# Patient Record
Sex: MALE | Race: WHITE | NOT HISPANIC OR LATINO
[De-identification: names, ages, dates, MRNs, and addresses within clinical notes are randomized per-mention and may not be internally consistent; named-entity substitution may affect disease eponyms.]

---

## 2017-07-19 ENCOUNTER — APPOINTMENT (OUTPATIENT)
Dept: HEART AND VASCULAR | Facility: CLINIC | Age: 52
End: 2017-07-19

## 2017-07-19 ENCOUNTER — RX RENEWAL (OUTPATIENT)
Age: 52
End: 2017-07-19

## 2017-07-19 VITALS
HEART RATE: 70 BPM | RESPIRATION RATE: 12 BRPM | SYSTOLIC BLOOD PRESSURE: 110 MMHG | BODY MASS INDEX: 30.51 KG/M2 | DIASTOLIC BLOOD PRESSURE: 70 MMHG | WEIGHT: 206 LBS | HEIGHT: 69 IN

## 2017-07-19 DIAGNOSIS — R06.09 OTHER FORMS OF DYSPNEA: ICD-10-CM

## 2017-07-19 DIAGNOSIS — Z82.49 FAMILY HISTORY OF ISCHEMIC HEART DISEASE AND OTHER DISEASES OF THE CIRCULATORY SYSTEM: ICD-10-CM

## 2017-07-19 DIAGNOSIS — M51.9 UNSPECIFIED THORACIC, THORACOLUMBAR AND LUMBOSACRAL INTERVERTEBRAL DISC DISORDER: ICD-10-CM

## 2017-07-19 DIAGNOSIS — Z87.898 PERSONAL HISTORY OF OTHER SPECIFIED CONDITIONS: ICD-10-CM

## 2017-07-19 DIAGNOSIS — R07.9 CHEST PAIN, UNSPECIFIED: ICD-10-CM

## 2017-07-19 DIAGNOSIS — G89.29 OTHER CHRONIC PAIN: ICD-10-CM

## 2017-07-19 DIAGNOSIS — Z80.42 FAMILY HISTORY OF MALIGNANT NEOPLASM OF PROSTATE: ICD-10-CM

## 2017-07-19 DIAGNOSIS — E74.04: ICD-10-CM

## 2017-07-19 DIAGNOSIS — Z82.69 FAMILY HISTORY OF OTHER DISEASES OF THE MUSCULOSKELETAL SYSTEM AND CONNECTIVE TISSUE: ICD-10-CM

## 2017-07-19 DIAGNOSIS — R94.31 ABNORMAL ELECTROCARDIOGRAM [ECG] [EKG]: ICD-10-CM

## 2017-07-19 DIAGNOSIS — M51.36 OTHER INTERVERTEBRAL DISC DEGENERATION, LUMBAR REGION: ICD-10-CM

## 2017-07-19 PROBLEM — Z00.00 ENCOUNTER FOR PREVENTIVE HEALTH EXAMINATION: Status: ACTIVE | Noted: 2017-07-19

## 2017-07-19 RX ORDER — OMEPRAZOLE 20 MG/1
20 CAPSULE, DELAYED RELEASE ORAL
Refills: 0 | Status: ACTIVE | COMMUNITY

## 2017-08-11 ENCOUNTER — APPOINTMENT (OUTPATIENT)
Dept: HEART AND VASCULAR | Facility: CLINIC | Age: 52
End: 2017-08-11
Payer: COMMERCIAL

## 2017-08-11 PROCEDURE — 93306 TTE W/DOPPLER COMPLETE: CPT

## 2017-08-11 PROCEDURE — 93880 EXTRACRANIAL BILAT STUDY: CPT

## 2017-08-30 ENCOUNTER — RESULT REVIEW (OUTPATIENT)
Age: 52
End: 2017-08-30

## 2017-09-28 ENCOUNTER — APPOINTMENT (OUTPATIENT)
Dept: HEART AND VASCULAR | Facility: CLINIC | Age: 52
End: 2017-09-28
Payer: COMMERCIAL

## 2017-09-28 VITALS
HEIGHT: 69 IN | RESPIRATION RATE: 16 BRPM | WEIGHT: 206 LBS | HEART RATE: 70 BPM | SYSTOLIC BLOOD PRESSURE: 124 MMHG | DIASTOLIC BLOOD PRESSURE: 80 MMHG | BODY MASS INDEX: 30.51 KG/M2

## 2017-09-28 PROCEDURE — 36415 COLL VENOUS BLD VENIPUNCTURE: CPT

## 2017-09-28 PROCEDURE — 99214 OFFICE O/P EST MOD 30 MIN: CPT | Mod: 25

## 2017-09-28 RX ORDER — METOPROLOL SUCCINATE 100 MG/1
100 TABLET, EXTENDED RELEASE ORAL
Qty: 2 | Refills: 0 | Status: DISCONTINUED | COMMUNITY
Start: 2017-07-19 | End: 2017-09-28

## 2017-09-28 RX ORDER — OXYCODONE HYDROCHLORIDE AND ACETAMINOPHEN 5; 325 MG/1; MG/1
5-325 TABLET ORAL
Refills: 0 | Status: DISCONTINUED | COMMUNITY
End: 2017-09-28

## 2017-09-29 ENCOUNTER — RESULT REVIEW (OUTPATIENT)
Age: 52
End: 2017-09-29

## 2017-09-29 LAB
ALBUMIN SERPL ELPH-MCNC: 4.1 G/DL
ALP BLD-CCNC: 60 U/L
ALT SERPL-CCNC: 24 U/L
ANION GAP SERPL CALC-SCNC: 15 MMOL/L
AST SERPL-CCNC: 19 U/L
BASOPHILS # BLD AUTO: 0.02 K/UL
BASOPHILS NFR BLD AUTO: 0.4 %
BILIRUB SERPL-MCNC: 0.5 MG/DL
BUN SERPL-MCNC: 15 MG/DL
CALCIUM SERPL-MCNC: 8.5 MG/DL
CHLORIDE SERPL-SCNC: 103 MMOL/L
CHOLEST SERPL-MCNC: 197 MG/DL
CHOLEST/HDLC SERPL: 4.2 RATIO
CO2 SERPL-SCNC: 24 MMOL/L
CREAT SERPL-MCNC: 0.82 MG/DL
EOSINOPHIL # BLD AUTO: 0.13 K/UL
EOSINOPHIL NFR BLD AUTO: 2.4 %
GLUCOSE SERPL-MCNC: 156 MG/DL
HCT VFR BLD CALC: 42.5 %
HDLC SERPL-MCNC: 47 MG/DL
HGB BLD-MCNC: 14.2 G/DL
IMM GRANULOCYTES NFR BLD AUTO: 0.2 %
LDLC SERPL CALC-MCNC: 111 MG/DL
LYMPHOCYTES # BLD AUTO: 2.1 K/UL
LYMPHOCYTES NFR BLD AUTO: 38.4 %
MAN DIFF?: NORMAL
MCHC RBC-ENTMCNC: 30.5 PG
MCHC RBC-ENTMCNC: 33.4 GM/DL
MCV RBC AUTO: 91.4 FL
MONOCYTES # BLD AUTO: 0.58 K/UL
MONOCYTES NFR BLD AUTO: 10.6 %
NEUTROPHILS # BLD AUTO: 2.63 K/UL
NEUTROPHILS NFR BLD AUTO: 48 %
PLATELET # BLD AUTO: 182 K/UL
POTASSIUM SERPL-SCNC: 3.9 MMOL/L
PROT SERPL-MCNC: 6.9 G/DL
RBC # BLD: 4.65 M/UL
RBC # FLD: 12.5 %
SODIUM SERPL-SCNC: 142 MMOL/L
TRIGL SERPL-MCNC: 194 MG/DL
WBC # FLD AUTO: 5.47 K/UL

## 2021-04-28 ENCOUNTER — APPOINTMENT (OUTPATIENT)
Dept: RHEUMATOLOGY | Facility: CLINIC | Age: 56
End: 2021-04-28

## 2022-01-26 ENCOUNTER — APPOINTMENT (OUTPATIENT)
Dept: HEART AND VASCULAR | Facility: CLINIC | Age: 57
End: 2022-01-26
Payer: COMMERCIAL

## 2022-01-26 VITALS
SYSTOLIC BLOOD PRESSURE: 140 MMHG | WEIGHT: 225 LBS | BODY MASS INDEX: 32.21 KG/M2 | HEIGHT: 70 IN | RESPIRATION RATE: 12 BRPM | OXYGEN SATURATION: 96 % | HEART RATE: 69 BPM | DIASTOLIC BLOOD PRESSURE: 80 MMHG | TEMPERATURE: 98 F

## 2022-01-26 DIAGNOSIS — Z87.19 PERSONAL HISTORY OF OTHER DISEASES OF THE DIGESTIVE SYSTEM: ICD-10-CM

## 2022-01-26 PROCEDURE — 99215 OFFICE O/P EST HI 40 MIN: CPT

## 2022-01-26 RX ORDER — ASPIRIN 81 MG
81 TABLET, DELAYED RELEASE (ENTERIC COATED) ORAL
Refills: 0 | Status: ACTIVE | COMMUNITY

## 2022-01-26 RX ORDER — METFORMIN HYDROCHLORIDE 500 MG/1
500 TABLET, COATED ORAL
Refills: 0 | Status: ACTIVE | COMMUNITY

## 2022-01-26 RX ORDER — ROSUVASTATIN CALCIUM 10 MG/1
10 TABLET, FILM COATED ORAL
Refills: 0 | Status: ACTIVE | COMMUNITY

## 2022-01-26 NOTE — PHYSICAL EXAM
[General Appearance - Well Developed] : well developed [Normal Appearance] : normal appearance [Well Groomed] : well groomed [General Appearance - Well Nourished] : well nourished [No Deformities] : no deformities [General Appearance - In No Acute Distress] : no acute distress [Normal Oral Mucosa] : normal oral mucosa [No Oral Pallor] : no oral pallor [No Oral Cyanosis] : no oral cyanosis [Normal Jugular Venous A Waves Present] : normal jugular venous A waves present [Normal Jugular Venous V Waves Present] : normal jugular venous V waves present [No Jugular Venous Mckeon A Waves] : no jugular venous mckeon A waves [Respiration, Rhythm And Depth] : normal respiratory rhythm and effort [Exaggerated Use Of Accessory Muscles For Inspiration] : no accessory muscle use [Auscultation Breath Sounds / Voice Sounds] : lungs were clear to auscultation bilaterally [Heart Rate And Rhythm] : heart rate and rhythm were normal [Heart Sounds] : normal S1 and S2 [Murmurs] : no murmurs present [Abdomen Soft] : soft [Abdomen Tenderness] : non-tender [Abdomen Mass (___ Cm)] : no abdominal mass palpated [Abnormal Walk] : normal gait [Gait - Sufficient For Exercise Testing] : the gait was sufficient for exercise testing [Nail Clubbing] : no clubbing of the fingernails [Cyanosis, Localized] : no localized cyanosis [Petechial Hemorrhages (___cm)] : no petechial hemorrhages [Skin Color & Pigmentation] : normal skin color and pigmentation [] : no rash [No Venous Stasis] : no venous stasis [Skin Lesions] : no skin lesions [No Skin Ulcers] : no skin ulcer [No Xanthoma] : no  xanthoma was observed

## 2022-01-26 NOTE — HISTORY OF PRESENT ILLNESS
[FreeTextEntry1] : Deric Xie returns for follow up.  He was last seen in September 2017.  \par \par He has L upper chest discomfort that is typically non exertional.  He denies sob, pnd, orthopnea, edema, palp, or loc.\par \par He is active.  He is not taking meds regularly. \par \par CTA 8/2017:  LAD/CX/RCA disease - mild; aortic root 4.2 cm\par Carotid Duplex 8/2017: no sig disease\par TTE 8/2017: nl lv sys fxn with RWMA; jasso dys\par \par Clinical hx reviewed in detail.\par \par Recommendations:\par 1. continue CV meds - encourage med compliance\par 2. exercise\par 3. EXSE\par 4. CPET\par 5. carotid duplex\par 6. collect records from Rheumatology\par 7. awaiting CPAP

## 2022-01-26 NOTE — REASON FOR VISIT
[Hypertension] : hypertension [Carotid, Aortic and Peripheral Vascular Disease] : carotid, aortic and peripheral vascular disease [FreeTextEntry3] : Pradip Soliz [Follow-Up - Clinic] : a clinic follow-up of [Cardiomyopathy] : cardiomyopathy [Coronary Artery Disease] : coronary artery disease [Hyperlipidemia] : hyperlipidemia [Peripheral Vascular Disease] : peripheral vascular disease

## 2022-01-26 NOTE — DISCUSSION/SUMMARY
[Cardiomyopathy] : cardiomyopathy [Sodium Restriction] : sodium restriction [Coronary Artery Disease] : coronary artery disease [Possible Cardiac Ischemia (Intermd Prob)] : possible cardiac ischemia (intermediate probability) [Non-Cardiac] : non-cardiac symptoms [Deteriorating] : deteriorating [Dietary Modification] : dietary modification [Exercise Regimen] : an exercise regimen [Weight Reduction] : weight reduction [Hyperlipidemia] : hyperlipidemia [Diet Modification] : diet modification [Exercise] : exercise [Weight Loss] : weight loss [Stable] : stable [None] : There are no changes in medication management [Exercise Rehab] : exercise rehabilitation [Patient] : the patient [de-identified] : dilated aortic root 4.2 cm; carotid artery plaque [FreeTextEntry1] : GURMEET - untreated - awaiting cpap\par \par pre diabetes

## 2022-02-08 ENCOUNTER — APPOINTMENT (OUTPATIENT)
Dept: HEART AND VASCULAR | Facility: CLINIC | Age: 57
End: 2022-02-08
Payer: COMMERCIAL

## 2022-02-08 ENCOUNTER — NON-APPOINTMENT (OUTPATIENT)
Age: 57
End: 2022-02-08

## 2022-02-08 VITALS
SYSTOLIC BLOOD PRESSURE: 112 MMHG | OXYGEN SATURATION: 98 % | DIASTOLIC BLOOD PRESSURE: 90 MMHG | BODY MASS INDEX: 32.88 KG/M2 | HEIGHT: 69 IN | TEMPERATURE: 97.9 F | WEIGHT: 222 LBS

## 2022-02-08 PROCEDURE — 94621 CARDIOPULM EXERCISE TESTING: CPT

## 2022-02-08 PROCEDURE — 94010 BREATHING CAPACITY TEST: CPT | Mod: 59

## 2022-02-09 ENCOUNTER — APPOINTMENT (OUTPATIENT)
Dept: HEART AND VASCULAR | Facility: CLINIC | Age: 57
End: 2022-02-09
Payer: COMMERCIAL

## 2022-02-09 VITALS
DIASTOLIC BLOOD PRESSURE: 72 MMHG | BODY MASS INDEX: 31.78 KG/M2 | HEIGHT: 70 IN | WEIGHT: 222 LBS | OXYGEN SATURATION: 96 % | SYSTOLIC BLOOD PRESSURE: 136 MMHG | HEART RATE: 72 BPM

## 2022-02-09 PROCEDURE — 93880 EXTRACRANIAL BILAT STUDY: CPT

## 2022-02-09 PROCEDURE — 93351 STRESS TTE COMPLETE: CPT

## 2022-02-09 PROCEDURE — 93320 DOPPLER ECHO COMPLETE: CPT

## 2022-02-09 PROCEDURE — 93325 DOPPLER ECHO COLOR FLOW MAPG: CPT

## 2022-02-16 ENCOUNTER — NON-APPOINTMENT (OUTPATIENT)
Age: 57
End: 2022-02-16

## 2022-03-23 ENCOUNTER — APPOINTMENT (OUTPATIENT)
Dept: HEART AND VASCULAR | Facility: CLINIC | Age: 57
End: 2022-03-23
Payer: COMMERCIAL

## 2022-03-23 DIAGNOSIS — I25.5 ISCHEMIC CARDIOMYOPATHY: ICD-10-CM

## 2022-03-23 PROCEDURE — 99443: CPT

## 2022-03-23 NOTE — REVIEW OF SYSTEMS
[Joint Pain] : joint pain [Joint Stiffness] : joint stiffness [Myalgia] : myalgia [Negative] : Heme/Lymph [Joint Swelling] : no joint swelling [Muscle Cramps] : no muscle cramps

## 2022-03-23 NOTE — DISCUSSION/SUMMARY
[Cardiomyopathy] : cardiomyopathy [Sodium Restriction] : sodium restriction [Coronary Artery Disease] : coronary artery disease [Possible Cardiac Ischemia (Intermd Prob)] : possible cardiac ischemia (intermediate probability) [Non-Cardiac] : non-cardiac symptoms [Deteriorating] : deteriorating [Dietary Modification] : dietary modification [Exercise Regimen] : an exercise regimen [Weight Reduction] : weight reduction [Hyperlipidemia] : hyperlipidemia [Diet Modification] : diet modification [Exercise] : exercise [Weight Loss] : weight loss [Stable] : stable [None] : There are no changes in medication management [Exercise Rehab] : exercise rehabilitation [Patient] : the patient [de-identified] : LVH; indeterminant jasso fxn [de-identified] : dilated aortic root 4.2 cm; carotid artery plaque [FreeTextEntry1] : GURMEET - untreated - awaiting cpap\par \par pre diabetes\par \par AI/TR - mild

## 2022-03-23 NOTE — HISTORY OF PRESENT ILLNESS
[FreeTextEntry1] : Deric Xie requests telephone follow up.  Consent obtained recorded.  He is at work and doctor is in [place], [place].    \par \par He has L upper chest discomfort that is typically non exertional.  He denies sob, pnd, orthopnea, edema, palp, or loc.\par \par He is active.  He is not taking meds regularly. \par \par CPET 1/2016: ischemic myocardial dysfxn; 112% predicted peak VO2\par CTA 8/2017:  LAD/CX/RCA disease - mild; aortic root 4.2 cm\par Carotid Duplex 8/2017: no sig disease\par TTE 8/2017: nl lv sys fxn with RWMA; jasso dys\par \par CPET 2/2022: ischemic myocardial dysfxn; 80% predicted peak VO2\par EXSE 2/2022: nl lv sys fxn; indeterminant jasso fxn; LVH; mild AI/TR; asc aorta 4.2 cm; 10:40 min Memo; no ischemia by WM\par Carotid Duplex 2/2022: min plaque b/l\par \par Clinical hx and results reviewed in detail.\par \par PE from January 2022 eval.\par \par Recommendations:\par 1. continue CV meds - encourage med compliance\par 2. exercise\par 3. get CTA\par 4. collect records from Rheumatology\par 5. awaiting CPAP

## 2022-03-23 NOTE — REASON FOR VISIT
[Structural Heart and Valve Disease] : structural heart and valve disease [Hyperlipidemia] : hyperlipidemia [Hypertension] : hypertension [Coronary Artery Disease] : coronary artery disease [Carotid, Aortic and Peripheral Vascular Disease] : carotid, aortic and peripheral vascular disease [FreeTextEntry3] : Pradip Soliz

## 2022-06-21 ENCOUNTER — RESULT REVIEW (OUTPATIENT)
Age: 57
End: 2022-06-21

## 2022-06-27 ENCOUNTER — NON-APPOINTMENT (OUTPATIENT)
Age: 57
End: 2022-06-27

## 2022-07-14 ENCOUNTER — RESULT REVIEW (OUTPATIENT)
Age: 57
End: 2022-07-14

## 2022-07-18 ENCOUNTER — NON-APPOINTMENT (OUTPATIENT)
Age: 57
End: 2022-07-18

## 2022-07-20 ENCOUNTER — NON-APPOINTMENT (OUTPATIENT)
Age: 57
End: 2022-07-20

## 2022-08-03 ENCOUNTER — NON-APPOINTMENT (OUTPATIENT)
Age: 57
End: 2022-08-03

## 2022-08-22 ENCOUNTER — NON-APPOINTMENT (OUTPATIENT)
Age: 57
End: 2022-08-22

## 2022-08-24 ENCOUNTER — NON-APPOINTMENT (OUTPATIENT)
Age: 57
End: 2022-08-24

## 2022-08-24 ENCOUNTER — APPOINTMENT (OUTPATIENT)
Dept: CARDIOTHORACIC SURGERY | Facility: CLINIC | Age: 57
End: 2022-08-24

## 2022-09-06 ENCOUNTER — NON-APPOINTMENT (OUTPATIENT)
Age: 57
End: 2022-09-06

## 2022-09-07 ENCOUNTER — APPOINTMENT (OUTPATIENT)
Dept: CARDIOTHORACIC SURGERY | Facility: CLINIC | Age: 57
End: 2022-09-07

## 2022-09-19 ENCOUNTER — NON-APPOINTMENT (OUTPATIENT)
Age: 57
End: 2022-09-19

## 2022-09-21 ENCOUNTER — APPOINTMENT (OUTPATIENT)
Dept: CARDIOTHORACIC SURGERY | Facility: CLINIC | Age: 57
End: 2022-09-21

## 2022-10-19 ENCOUNTER — APPOINTMENT (OUTPATIENT)
Dept: CARDIOTHORACIC SURGERY | Facility: CLINIC | Age: 57
End: 2022-10-19

## 2022-11-01 ENCOUNTER — NON-APPOINTMENT (OUTPATIENT)
Age: 57
End: 2022-11-01

## 2022-11-02 ENCOUNTER — APPOINTMENT (OUTPATIENT)
Dept: CARDIOTHORACIC SURGERY | Facility: CLINIC | Age: 57
End: 2022-11-02

## 2023-06-23 ENCOUNTER — TRANSCRIPTION ENCOUNTER (OUTPATIENT)
Age: 58
End: 2023-06-23

## 2023-07-12 ENCOUNTER — APPOINTMENT (OUTPATIENT)
Dept: HEART AND VASCULAR | Facility: CLINIC | Age: 58
End: 2023-07-12
Payer: COMMERCIAL

## 2023-07-12 VITALS
BODY MASS INDEX: 29.92 KG/M2 | DIASTOLIC BLOOD PRESSURE: 80 MMHG | HEART RATE: 64 BPM | RESPIRATION RATE: 12 BRPM | SYSTOLIC BLOOD PRESSURE: 132 MMHG | HEIGHT: 70 IN | WEIGHT: 209 LBS | TEMPERATURE: 98 F | OXYGEN SATURATION: 98 %

## 2023-07-12 DIAGNOSIS — R73.03 PREDIABETES.: ICD-10-CM

## 2023-07-12 DIAGNOSIS — I65.29 OCCLUSION AND STENOSIS OF UNSPECIFIED CAROTID ARTERY: ICD-10-CM

## 2023-07-12 DIAGNOSIS — G47.33 OBSTRUCTIVE SLEEP APNEA (ADULT) (PEDIATRIC): ICD-10-CM

## 2023-07-12 DIAGNOSIS — H53.9 UNSPECIFIED VISUAL DISTURBANCE: ICD-10-CM

## 2023-07-12 DIAGNOSIS — I25.10 ATHEROSCLEROTIC HEART DISEASE OF NATIVE CORONARY ARTERY W/OUT ANGINA PECTORIS: ICD-10-CM

## 2023-07-12 DIAGNOSIS — I51.7 CARDIOMEGALY: ICD-10-CM

## 2023-07-12 DIAGNOSIS — I35.1 NONRHEUMATIC AORTIC (VALVE) INSUFFICIENCY: ICD-10-CM

## 2023-07-12 DIAGNOSIS — E78.00 PURE HYPERCHOLESTEROLEMIA, UNSPECIFIED: ICD-10-CM

## 2023-07-12 DIAGNOSIS — I51.89 OTHER ILL-DEFINED HEART DISEASES: ICD-10-CM

## 2023-07-12 DIAGNOSIS — I07.1 RHEUMATIC TRICUSPID INSUFFICIENCY: ICD-10-CM

## 2023-07-12 PROCEDURE — 99215 OFFICE O/P EST HI 40 MIN: CPT

## 2023-07-12 RX ORDER — METOPROLOL SUCCINATE 100 MG/1
100 TABLET, EXTENDED RELEASE ORAL
Qty: 2 | Refills: 0 | Status: DISCONTINUED | COMMUNITY
Start: 2022-03-28 | End: 2023-07-12

## 2023-07-12 RX ORDER — ALBUTEROL SULFATE 5 MG/ML
(5 MG/ML) SOLUTION, NON-ORAL INHALATION
Refills: 0 | Status: DISCONTINUED | COMMUNITY
End: 2023-07-12

## 2023-07-12 NOTE — HISTORY OF PRESENT ILLNESS
[FreeTextEntry1] : Deric Xie returns for follow up.  \par \par He was last seen by me in March 2022.  \par \par He was not able to follow up with Dr Boudreaux re aorta management in late 2022 due family issues.\par \par He developed visual changes that led to eval at [facility].  CT, CTA, MRI of head and neck were unremarkable for CVA.  Ophtho eval is ongoing to address visual field issues (get records).  \par \par He snores and has daytime fatigue with known GURMEET diagnosis.  \par \par He denies cp, sob, pnd, orthopnea, edema, palp, or loc.\par \par He is active.  He is now compliant with meds.  He is not using CPAP.\par \par CPET 1/2016: ischemic myocardial dysfxn; 112% predicted peak VO2\par CTA 8/2017:  LAD/CX/RCA disease - mild; aortic root 4.2 cm\par Carotid Duplex 8/2017: no sig disease\par TTE 8/2017: nl lv sys fxn with RWMA; jasso dys\par \par CPET 2/2022: ischemic myocardial dysfxn; 80% predicted peak VO2\par EXSE 2/2022: nl lv sys fxn; indeterminant jasso fxn; LVH; mild AI/TR; asc aorta 4.2 cm; 10:40 min Memo; no ischemia by WM\par Carotid Duplex 2/2022: min plaque b/l\par CTA 6/2022: mild LAD (0.09->0.78 CT FFR), mild CX (0.94 CT FFR), mild RCA (0.99 CT FFR); aorta 4.9 cm\par \par Clinical hx  reviewed in detail.\par \par Recommendations:\par 1. continue CV meds - encourage med and CPAP compliance\par 2. exercise\par 3. telemetry\par 4. collect records from Rheumatology, Ophtho\par 5. reconnect with Dr Boudreaux

## 2023-07-12 NOTE — PHYSICAL EXAM
[General Appearance - Well Developed] : well developed [Normal Appearance] : normal appearance [Well Groomed] : well groomed [General Appearance - Well Nourished] : well nourished [No Deformities] : no deformities [General Appearance - In No Acute Distress] : no acute distress [Normal Oral Mucosa] : normal oral mucosa [No Oral Pallor] : no oral pallor [No Oral Cyanosis] : no oral cyanosis [Normal Jugular Venous A Waves Present] : normal jugular venous A waves present [Normal Jugular Venous V Waves Present] : normal jugular venous V waves present [No Jugular Venous Mckeon A Waves] : no jugular venous mckeon A waves [Respiration, Rhythm And Depth] : normal respiratory rhythm and effort [Exaggerated Use Of Accessory Muscles For Inspiration] : no accessory muscle use [Auscultation Breath Sounds / Voice Sounds] : lungs were clear to auscultation bilaterally [Heart Rate And Rhythm] : heart rate and rhythm were normal [Heart Sounds] : normal S1 and S2 [FreeTextEntry1] : sys and jasso murmur [Abdomen Soft] : soft [Abdomen Tenderness] : non-tender [Abdomen Mass (___ Cm)] : no abdominal mass palpated [Abnormal Walk] : normal gait [Gait - Sufficient For Exercise Testing] : the gait was sufficient for exercise testing [Nail Clubbing] : no clubbing of the fingernails [Cyanosis, Localized] : no localized cyanosis [Petechial Hemorrhages (___cm)] : no petechial hemorrhages [Skin Color & Pigmentation] : normal skin color and pigmentation [] : no rash [No Venous Stasis] : no venous stasis [Skin Lesions] : no skin lesions [No Skin Ulcers] : no skin ulcer [No Xanthoma] : no  xanthoma was observed

## 2023-07-12 NOTE — REVIEW OF SYSTEMS
[Fever] : no fever [Headache] : no headache [Chills] : no chills [Feeling Fatigued] : feeling fatigued [Blurry Vision] : no blurred vision [Seeing Double (Diplopia)] : no diplopia [Eye Pain] : no eye pain [Visual Disturbances] : visual disturbances [Joint Pain] : joint pain [Joint Swelling] : no joint swelling [Joint Stiffness] : joint stiffness [Muscle Cramps] : no muscle cramps [Myalgia] : myalgia [Negative] : Heme/Lymph

## 2023-07-12 NOTE — DISCUSSION/SUMMARY
[Cardiomyopathy] : cardiomyopathy [Sodium Restriction] : sodium restriction [Coronary Artery Disease] : coronary artery disease [Possible Cardiac Ischemia (Intermd Prob)] : possible cardiac ischemia (intermediate probability) [Non-Cardiac] : non-cardiac symptoms [Dietary Modification] : dietary modification [Exercise Regimen] : an exercise regimen [Weight Reduction] : weight reduction [Hyperlipidemia] : hyperlipidemia [Diet Modification] : diet modification [Exercise] : exercise [Weight Loss] : weight loss [Stable] : stable [None] : There are no changes in medication management [Exercise Rehab] : exercise rehabilitation [Patient] : the patient [de-identified] : mild LAD (0.90 -> 0.78 CT FFR), mild CX (0.94 CT FFR), mild RCA (0.99 CYT FFR) 6/2022 CTA [de-identified] : dilated aortic root and asc aorta 4.4 and 4.2 cm by TTE 6/2023; dilated aorta 4.9 cm y CTA 6/2022; dilated aortic root 4.2 cm; carotid artery plaque [FreeTextEntry1] : GURMEET - untreated - not using CPAP\par \par pre diabetes\par \par AI/TR - mild\par \par visual change

## 2023-07-13 ENCOUNTER — NON-APPOINTMENT (OUTPATIENT)
Age: 58
End: 2023-07-13

## 2023-07-14 ENCOUNTER — NON-APPOINTMENT (OUTPATIENT)
Age: 58
End: 2023-07-14

## 2023-07-19 ENCOUNTER — NON-APPOINTMENT (OUTPATIENT)
Age: 58
End: 2023-07-19

## 2023-08-09 ENCOUNTER — NON-APPOINTMENT (OUTPATIENT)
Age: 58
End: 2023-08-09

## 2023-08-23 ENCOUNTER — RESULT REVIEW (OUTPATIENT)
Age: 58
End: 2023-08-23

## 2023-08-28 NOTE — ASSESSMENT
[FreeTextEntry1] : 58 year old male with a past medical history of hyperlipidemia, obstructive sleep apnea (not on BIBAP) hypertension, pre-diabetes, coronary artery disease, peripheral vascular disease, carotid disease who presets for evaluation and management of aortic root aneurysm. Pt is referred by Dr. William Almonte.

## 2023-08-28 NOTE — PROCEDURE
[FreeTextEntry1] : Dr. Boudreaux reviewed the indications for surgery, and used our webpage [email or web address] <[email or web address]> to illustrate the aorta and anatomy of the heart. Those indications are the following: size greater than 5.0 cm, symptomatic aneurysms, family history of aortic dissection or aneurysm death with a size greater than 4.5 cm, other necessary cardiac procedures such as coronary artery bypass grafting or valvular disorders with an aneurysm greater than 4.5 cm, or connective tissue disorders with an aneurysm size greater than 4.5 cm. The patient does not meet size criteria for surgical intervention at the time. Dr. Boudreaux discussed activity restrictions with the patient, and would advise exercise at a moderate amount with no heavy lifting over one third of body weight, and avoiding heart rates that exceed 140 beats per minute. In addition, every patient should abstain from tobacco abuse and to avoid all illicit drug use, especially stimulants such as cocaine or methamphetamine. Dr. Boudreaux also counseled regarding maintaining a healthy heart diet, and losing any excessive weight as this also put undue stress on both the aorta and entire cardiovascular system. First degree family members should be screened for bicuspid valve disease, and ascending aortic aneurysms.   Patient was advised to view the educational video prior to this visit regarding aortic pathology, risk factors, surgical procedures, and lifestyle modifications. Video can be retrieved at <[email or web address]>.

## 2023-08-28 NOTE — DATA REVIEWED
[FreeTextEntry1] : CT Cardiac 6/21/2022  1.  The calcium score is moderate at 358 Agatston units, which is at the 90 percentile, adjusted for age, gender and race.  2.  Less than 25% stenosis of the Left Main.  3.  Mild stenoses of the proximal LAD, D1, proximal LCX, proximal and mid RCA.  4.  Minimum stenosis of the mid LAD.  5.  Remaining segments are normal. 6. No substantial change in aneurysmal aortic root and ascending aorta since 2017. Aortic root again measures up to 4.9 cm.  Stress echo 2/9/22:  Exercise Capacity is 12 METS,normal hemodynamic response, Normal EKG response, Normal augmentation in left ventricular systolic function, appropriate HR response, Appropriate BP response, Normal stress echo with no evidence of inducible ischemia.trileaflet AV. Mild AI. Aortic root 3.9cm. ascending aorta 4.2cm.     CT FFR DAta Prep and analysis 7/16/2022 1.CT-FFR is negative:The flow is > 0.80  2.CT-FFR is positive. However, overall does not suggest a discrete physiologically significant lesion: The flow is < or = to 0.80 in one or more segments of one or more coronary arteries (perhaps because it is a distal segment (vessel size is less than 2 mm)  3.CT-FFR is positive and clinically significant:The flow is < or = to 0.80, and the finding is clinically significant.  Carotid Duplex 2/9/22:  <50% stenosis of the internal carotid arteries bilaterally  Bilateral antegrade vertebral blood flow

## 2023-08-28 NOTE — HISTORY OF PRESENT ILLNESS
[FreeTextEntry1] : 58 year old male with a past medical history of hyperlipidemia, obstructive sleep apnea (not on BIBAP) hypertension, pre-diabetes, coronary artery disease, peripheral vascular disease, carotid disease who presets for evaluation and management of aortic root aneurysm. Pt is referred by Dr. William Almonte.   TTE 6/23/23 normal EF. ascending aorta 4.4cm. aortic arch 4.2cm.

## 2023-08-31 ENCOUNTER — NON-APPOINTMENT (OUTPATIENT)
Age: 58
End: 2023-08-31

## 2023-09-20 ENCOUNTER — APPOINTMENT (OUTPATIENT)
Dept: CARDIOTHORACIC SURGERY | Facility: CLINIC | Age: 58
End: 2023-09-20
Payer: COMMERCIAL

## 2023-09-20 ENCOUNTER — NON-APPOINTMENT (OUTPATIENT)
Age: 58
End: 2023-09-20

## 2023-09-20 VITALS
WEIGHT: 209 LBS | HEART RATE: 90 BPM | OXYGEN SATURATION: 94 % | HEIGHT: 70 IN | DIASTOLIC BLOOD PRESSURE: 80 MMHG | RESPIRATION RATE: 13 BRPM | SYSTOLIC BLOOD PRESSURE: 141 MMHG | TEMPERATURE: 98.1 F | BODY MASS INDEX: 29.92 KG/M2

## 2023-09-20 DIAGNOSIS — I77.810 THORACIC AORTIC ECTASIA: ICD-10-CM

## 2023-09-20 DIAGNOSIS — I71.20 THORACIC AORTIC ANEURYSM, WITHOUT RUPTURE, UNSPECIFIED: ICD-10-CM

## 2023-09-20 PROCEDURE — 99204 OFFICE O/P NEW MOD 45 MIN: CPT

## 2023-09-25 PROBLEM — I77.810 DILATED AORTIC ROOT: Status: ACTIVE | Noted: 2017-09-28

## 2023-09-25 PROBLEM — I71.20 THORACIC AORTIC ANEURYSM WITHOUT RUPTURE: Status: ACTIVE | Noted: 2023-09-25

## 2023-10-03 ENCOUNTER — TRANSCRIPTION ENCOUNTER (OUTPATIENT)
Age: 58
End: 2023-10-03

## 2023-10-13 ENCOUNTER — NON-APPOINTMENT (OUTPATIENT)
Age: 58
End: 2023-10-13

## 2023-11-02 ENCOUNTER — APPOINTMENT (OUTPATIENT)
Dept: CARDIOTHORACIC SURGERY | Facility: HOSPITAL | Age: 58
End: 2023-11-02

## 2023-11-09 ENCOUNTER — NON-APPOINTMENT (OUTPATIENT)
Age: 58
End: 2023-11-09

## 2023-11-27 ENCOUNTER — NON-APPOINTMENT (OUTPATIENT)
Age: 58
End: 2023-11-27